# Patient Record
Sex: FEMALE | Race: BLACK OR AFRICAN AMERICAN | NOT HISPANIC OR LATINO | URBAN - METROPOLITAN AREA
[De-identification: names, ages, dates, MRNs, and addresses within clinical notes are randomized per-mention and may not be internally consistent; named-entity substitution may affect disease eponyms.]

---

## 2018-07-13 ENCOUNTER — EMERGENCY (EMERGENCY)
Facility: HOSPITAL | Age: 83
LOS: 1 days | Discharge: HOME | End: 2018-07-13
Attending: EMERGENCY MEDICINE | Admitting: EMERGENCY MEDICINE
Payer: MEDICARE

## 2018-07-13 VITALS
HEART RATE: 92 BPM | TEMPERATURE: 98 F | DIASTOLIC BLOOD PRESSURE: 96 MMHG | SYSTOLIC BLOOD PRESSURE: 209 MMHG | OXYGEN SATURATION: 97 % | RESPIRATION RATE: 16 BRPM

## 2018-07-13 VITALS
HEART RATE: 86 BPM | SYSTOLIC BLOOD PRESSURE: 185 MMHG | DIASTOLIC BLOOD PRESSURE: 78 MMHG | TEMPERATURE: 98 F | RESPIRATION RATE: 16 BRPM | OXYGEN SATURATION: 98 %

## 2018-07-13 PROBLEM — Z00.00 ENCOUNTER FOR PREVENTIVE HEALTH EXAMINATION: Status: ACTIVE | Noted: 2018-07-13

## 2018-07-13 PROCEDURE — 99284 EMERGENCY DEPT VISIT MOD MDM: CPT

## 2018-07-13 PROCEDURE — 70450 CT HEAD/BRAIN W/O DYE: CPT | Mod: 26

## 2018-07-13 PROCEDURE — 27520 TREAT KNEECAP FRACTURE: CPT

## 2018-07-13 PROCEDURE — 70450 CT HEAD/BRAIN W/O DYE: CPT

## 2018-07-13 PROCEDURE — 73562 X-RAY EXAM OF KNEE 3: CPT | Mod: 26,RT

## 2018-07-13 PROCEDURE — 99284 EMERGENCY DEPT VISIT MOD MDM: CPT | Mod: 25

## 2018-07-13 PROCEDURE — 73562 X-RAY EXAM OF KNEE 3: CPT

## 2018-07-13 RX ORDER — IBUPROFEN 200 MG
600 TABLET ORAL ONCE
Qty: 0 | Refills: 0 | Status: COMPLETED | OUTPATIENT
Start: 2018-07-13 | End: 2018-07-13

## 2018-07-13 RX ORDER — AMLODIPINE BESYLATE 2.5 MG/1
5 TABLET ORAL ONCE
Qty: 0 | Refills: 0 | Status: COMPLETED | OUTPATIENT
Start: 2018-07-13 | End: 2018-07-13

## 2018-07-13 RX ADMIN — Medication 600 MILLIGRAM(S): at 17:35

## 2018-07-13 RX ADMIN — AMLODIPINE BESYLATE 5 MILLIGRAM(S): 2.5 TABLET ORAL at 17:35

## 2018-07-13 RX ADMIN — Medication 600 MILLIGRAM(S): at 15:43

## 2018-07-13 NOTE — ED ADULT NURSE NOTE - CHIEF COMPLAINT QUOTE
pt referred her by PCP for medical evaluation s/p fall yesterday. Pt reports tripping over a toy last night and falling hitting her right knee. pt denies any use of blood thinner, Loc or head injury.  able to ambulate with a cane.

## 2018-07-13 NOTE — ED PROVIDER NOTE - SKIN, MLM
No contusion, hematoma, abrasion to the head and entire scalp is nontender.  Skin normal color for race, warm, dry and intact. No evidence of rash.
No

## 2018-07-13 NOTE — ED PROVIDER NOTE - ATTENDING CONTRIBUTION TO CARE
knee pain after trip and fall last night.  hit head but no loc or headache.  neuro exam non focal.  knee tender anteriorly.  xray done showing patellar fracture.  ct head neg.  bp elevated on arrival without symptoms of end organ damage.  suspect related to pain/ missed dose of med.  given motrin and dose of norvasc with improvement.  to f/u in clinic

## 2018-07-13 NOTE — ED ADULT NURSE NOTE - CHPI ED SYMPTOMS NEG
no loss of consciousness/no fever/no vomiting/no weakness/no tingling/no deformity/no numbness/no bleeding/no confusion/no abrasion

## 2018-07-13 NOTE — ED PROVIDER NOTE - OBJECTIVE STATEMENT
A 83 year old AA female w/ PMH of HTN presents with daughter after a fall at midnight.  The patient states that she was walking in her castro, when she tripped and fell, landing on her right knee.  Patient also reports hitting her forehead into the wall.  The patient was unsure why she fell, but states there were children's toys on the floor that she might have tripped over.  Denies any LOC.  Denies any prodromal symptoms or syncope.  The patient reports progressively worsening right knee pain with limited ROM.  Patient has been able to ambulate, but with a cane.  The patient tried taking Aleve at home w/o improvement of pain.  The patient currently denies any weakness, headache, nausea, vomiting, SOB, CP, blurring vision, diplopia, confusion, memory loss.  Of note, the patient reports she did not take her HTN medication this morning.  Patient does take ASA 81mg daily. A 83 year old AA female w/ PMH of HTN presents with daughter after a fall at midnight.  The patient states that she was walking in her castro, when she tripped and fell, landing on her right knee.  Patient also reports hitting her forehead into the wall.  The patient was unsure why she fell, but states there were children's toys on the floor that she might have tripped over.  Denies any LOC.  Denies any prodromal symptoms or syncope.  The patient reports progressively worsening right knee pain with limited ROM.  Patient has been able to ambulate, but with a cane.  The patient tried taking Aleve at home w/o improvement of pain.  The patient currently denies any weakness, headache, nausea, vomiting, SOB, CP, blurring vision, diplopia, confusion, memory loss.  Of note, the patient reports she did not take her HTN medication this morning.  Patient and daughter do not remember the name of the medication patient takes.  Patient does take ASA 81mg daily. A 83 year old AA female w/ PMH of HTN presents with daughter after a fall at midnight.  The patient states that she was walking in her castro, when she tripped and fell, landing on her right knee.  Patient also reports hitting her forehead into the wall.  The patient was unsure why she fell, but states there were children's toys on the floor that she might have tripped over.  Denies any LOC.  Denies any prodromal symptoms or syncope.  The patient reports progressively worsening right knee pain with limited ROM and rates her pain a 10/10.  Patient has been able to ambulate, but with a cane.  The patient tried taking Aleve at home w/o improvement of pain.  The patient currently denies any weakness, headache, nausea, vomiting, SOB, CP, blurring vision, diplopia, confusion, memory loss.  Of note, the patient reports she did not take her HTN medication this morning.  Patient and daughter do not remember the name of the medication patient takes.  Patient does take ASA 81mg daily.

## 2018-07-13 NOTE — ED PROVIDER NOTE - CONSTITUTIONAL, MLM
normal... Awake, alert, oriented to person, place, time/situation and in mild painful distress, ambulating with antalgic gait and with a cane.

## 2018-07-13 NOTE — ED ADULT NURSE NOTE - OBJECTIVE STATEMENT
Pt came to ED complaining of right knee pain due to mechanical trip and fall to right knee last night. Pt is able to move knee and walk with cane. Not obvious DCAPBTLS found at this time. Pt is alert and oriented x3. Denies head trauma or LOC.

## 2018-07-13 NOTE — ED ADULT TRIAGE NOTE - CHIEF COMPLAINT QUOTE
pt referred her by PCP for medical evaluation s/p fall yesterday. Pt reports tripping over a toy last night and falling hitting her right knee. pt referred her by PCP for medical evaluation s/p fall yesterday. Pt reports tripping over a toy last night and falling hitting her right knee. pt denies any use of blood thinner, Loc or head injury.  able to ambulate with a cane.

## 2018-07-13 NOTE — ED PROVIDER NOTE - MEDICAL DECISION MAKING DETAILS
PT PLACED IN KNEE IMMOBILE AND fu ORTHO ON MONDAY dc WITH FAMILY I have discussed the discharge plan with the patient. The patient agrees with the plan, as discussed.  The patient understands Emergency Department diagnosis is a preliminary diagnosis often based on limited information and that the patient must adhere to the follow-up plan as discussed.  The patient understands that if the symptoms worsen or if prescribed medications do not have the desired/planned effect that the patient may return to the Emergency Department at any time for further evaluation and treatment.

## 2018-07-13 NOTE — ED PROVIDER NOTE - MUSCULOSKELETAL, MLM
Contusion to the right knee with moderate effusion.  Tenderness anterior knee with limited ROM.  Lachman negative, Nilda's negative.

## 2018-07-16 ENCOUNTER — APPOINTMENT (OUTPATIENT)
Dept: ORTHOPEDIC SURGERY | Facility: CLINIC | Age: 83
End: 2018-07-16
Payer: MEDICARE

## 2018-07-17 ENCOUNTER — APPOINTMENT (OUTPATIENT)
Dept: ORTHOPEDIC SURGERY | Facility: CLINIC | Age: 83
End: 2018-07-17
Payer: MEDICARE

## 2018-07-17 VITALS — WEIGHT: 153 LBS | BODY MASS INDEX: 30.04 KG/M2 | HEIGHT: 60 IN | OXYGEN SATURATION: 98 %

## 2018-07-17 DIAGNOSIS — M25.561 PAIN IN RIGHT KNEE: ICD-10-CM

## 2018-07-17 DIAGNOSIS — I10 ESSENTIAL (PRIMARY) HYPERTENSION: ICD-10-CM
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
## 2018-07-17 DIAGNOSIS — S82.034A NONDISPLACED TRANSVERSE FRACTURE OF RIGHT PATELLA, INITIAL ENCOUNTER FOR CLOSED FRACTURE: ICD-10-CM

## 2018-07-17 DIAGNOSIS — S09.90XA UNSPECIFIED INJURY OF HEAD, INITIAL ENCOUNTER: ICD-10-CM
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
## 2018-07-17 PROCEDURE — 99203 OFFICE O/P NEW LOW 30 MIN: CPT

## 2018-08-27 ENCOUNTER — APPOINTMENT (OUTPATIENT)
Dept: ORTHOPEDIC SURGERY | Facility: CLINIC | Age: 83
End: 2018-08-27

## 2018-09-11 ENCOUNTER — FORM ENCOUNTER (OUTPATIENT)
Age: 83
End: 2018-09-11

## 2018-09-12 ENCOUNTER — OUTPATIENT (OUTPATIENT)
Dept: OUTPATIENT SERVICES | Facility: HOSPITAL | Age: 83
LOS: 1 days | End: 2018-09-12
Payer: MEDICARE

## 2018-09-12 ENCOUNTER — APPOINTMENT (OUTPATIENT)
Dept: ORTHOPEDIC SURGERY | Facility: CLINIC | Age: 83
End: 2018-09-12
Payer: MEDICARE

## 2018-09-12 PROCEDURE — 73562 X-RAY EXAM OF KNEE 3: CPT | Mod: 26,RT

## 2018-09-12 PROCEDURE — 99213 OFFICE O/P EST LOW 20 MIN: CPT

## 2018-09-12 PROCEDURE — 73562 X-RAY EXAM OF KNEE 3: CPT

## 2018-10-15 ENCOUNTER — FORM ENCOUNTER (OUTPATIENT)
Age: 83
End: 2018-10-15

## 2018-10-16 ENCOUNTER — OUTPATIENT (OUTPATIENT)
Dept: OUTPATIENT SERVICES | Facility: HOSPITAL | Age: 83
LOS: 1 days | End: 2018-10-16
Payer: MEDICARE

## 2018-10-16 ENCOUNTER — APPOINTMENT (OUTPATIENT)
Dept: ORTHOPEDIC SURGERY | Facility: CLINIC | Age: 83
End: 2018-10-16
Payer: MEDICARE

## 2018-10-16 VITALS — WEIGHT: 153 LBS | BODY MASS INDEX: 30.04 KG/M2 | HEIGHT: 60 IN

## 2018-10-16 DIAGNOSIS — S82.034A NONDISPLACED TRANSVERSE FRACTURE OF RIGHT PATELLA, INITIAL ENCOUNTER FOR CLOSED FRACTURE: ICD-10-CM

## 2018-10-16 PROCEDURE — 73562 X-RAY EXAM OF KNEE 3: CPT

## 2018-10-16 PROCEDURE — 99213 OFFICE O/P EST LOW 20 MIN: CPT

## 2018-10-16 PROCEDURE — 73562 X-RAY EXAM OF KNEE 3: CPT | Mod: 26,RT

## 2018-10-17 PROBLEM — S82.034A CLOSED NONDISPLACED TRANSVERSE FRACTURE OF RIGHT PATELLA, INITIAL ENCOUNTER: Status: ACTIVE | Noted: 2018-07-17

## 2022-09-12 ENCOUNTER — APPOINTMENT (OUTPATIENT)
Dept: SURGERY | Facility: CLINIC | Age: 87
End: 2022-09-12

## 2022-09-12 VITALS
SYSTOLIC BLOOD PRESSURE: 147 MMHG | HEIGHT: 60 IN | DIASTOLIC BLOOD PRESSURE: 75 MMHG | BODY MASS INDEX: 29.64 KG/M2 | TEMPERATURE: 97.3 F | HEART RATE: 99 BPM | WEIGHT: 151 LBS | OXYGEN SATURATION: 96 %

## 2022-09-12 DIAGNOSIS — R22.2 LOCALIZED SWELLING, MASS AND LUMP, TRUNK: ICD-10-CM

## 2022-09-12 DIAGNOSIS — Z87.442 PERSONAL HISTORY OF URINARY CALCULI: ICD-10-CM

## 2022-09-12 DIAGNOSIS — Z78.9 OTHER SPECIFIED HEALTH STATUS: ICD-10-CM

## 2022-09-12 DIAGNOSIS — Z82.69 FAMILY HISTORY OF OTHER DISEASES OF THE MUSCULOSKELETAL SYSTEM AND CONNECTIVE TISSUE: ICD-10-CM

## 2022-09-12 DIAGNOSIS — Z83.3 FAMILY HISTORY OF DIABETES MELLITUS: ICD-10-CM

## 2022-09-12 DIAGNOSIS — Z85.3 PERSONAL HISTORY OF MALIGNANT NEOPLASM OF BREAST: ICD-10-CM

## 2022-09-12 DIAGNOSIS — Z86.79 PERSONAL HISTORY OF OTHER DISEASES OF THE CIRCULATORY SYSTEM: ICD-10-CM

## 2022-09-12 PROCEDURE — 99203 OFFICE O/P NEW LOW 30 MIN: CPT

## 2022-09-12 NOTE — REASON FOR VISIT
[Consultation] : a consultation visit [FreeTextEntry1] : Consultation requested by: Dr. Sharon Campos

## 2022-09-12 NOTE — ASSESSMENT
[FreeTextEntry1] : Ms. Esquivel is an 87-year-old woman with an anterior chest wall mass.  Given her history of breast cancer, we will perform an ultrasound of the area prior to planning for excision of the anterior chest wall mass.

## 2022-09-12 NOTE — CONSULT LETTER
[FreeTextEntry1] : 2022\par \par \par \par Sharon Campos M.D.\par [facility]\par [address] [place] [address]\[place] [address]\par Telephone #: [phone]\par \par \par Re: Jena Esquivel\par : 1935\par \par \par Dear Dr. Campos:\par \par I had the opportunity to see Ms. Esquivel today for evaluation and management of a mass of the midline sternum.  She stated the mass has been present for approximately 1 year.  She stated approximately 2 months ago it spontaneously ruptured.  She noticed the mass herself.  She denied pain of the mass.  She stated the mass is waxing and waning in size.  It began "leaking" this week, and is draining purulent fluid.\par \par On physical examination, her height is 5 feet, her weight is 151 pounds, and BMI 29.49.  Her temperature is 97.3 °F, blood pressure is 147/75, heart rate 99, and O2 saturation is 96% on room air.  In general, she is a well-dressed, well-nourished woman who appears her stated age and is in no acute distress.  She is calm, alert and oriented x3.  HEENT exam demonstrates no scleral icterus and a normocephalic atraumatic appearance.  Her extremities are warm and dry without clubbing, cyanosis or edema.  Her anterior chest wall has an approximately 2 cm mass that is fluctuant without overlying erythema.  There is some leakage/drainage from the lateral edge.  With pressure, some greyish material was expressed.\par \par In summary, Ms. Esquivel is an 87-year-old woman with an anterior chest wall mass.  Given her history of breast cancer, we will perform an ultrasound of the area prior to planning for excision of the anterior chest wall mass.\par \par Thank you for the opportunity to care for this patient. Please do not hesitate to contact me in the event that you have any questions or concerns about the care of this patient.\par \par Sincerely,\par \par \par \par Lynda Steven M.D.

## 2022-09-12 NOTE — HISTORY OF PRESENT ILLNESS
[de-identified] : Ms. Esquivel presented today for evaluation and management of a mass of the midline sternum.  She stated the mass has been present for approximately 1 year.  She stated approximately 2 months ago it spontaneously ruptured.  She noticed the mass herself.  She denied pain of the mass.  She stated the mass is waxing and waning in size.  It began "leaking" this week, and is draining purulent fluid.

## 2022-09-12 NOTE — PHYSICAL EXAM
[Calm] : calm [de-identified] : NAD, comfortable [de-identified] : NCAT, no scleral icterus [de-identified] : No clubbing, cyanosis, or edema. [de-identified] : Warm, dry.  Anterior chest wall has an approximately 2 cm mass without overlying erythema noted.  With pressure, greyish material was expressed. [de-identified] : A&Ox3

## 2022-09-13 ENCOUNTER — APPOINTMENT (OUTPATIENT)
Dept: ULTRASOUND IMAGING | Facility: HOSPITAL | Age: 87
End: 2022-09-13

## 2022-09-13 ENCOUNTER — OUTPATIENT (OUTPATIENT)
Dept: OUTPATIENT SERVICES | Facility: HOSPITAL | Age: 87
LOS: 1 days | End: 2022-09-13
Payer: MEDICARE

## 2022-09-13 PROCEDURE — 76604 US EXAM CHEST: CPT

## 2022-09-13 PROCEDURE — 76604 US EXAM CHEST: CPT | Mod: 26

## 2022-09-15 DIAGNOSIS — Z01.818 ENCOUNTER FOR OTHER PREPROCEDURAL EXAMINATION: ICD-10-CM

## 2022-09-20 ENCOUNTER — RESULT REVIEW (OUTPATIENT)
Age: 87
End: 2022-09-20

## 2022-09-20 PROCEDURE — 88312 SPECIAL STAINS GROUP 1: CPT | Mod: 26

## 2022-09-21 ENCOUNTER — RESULT REVIEW (OUTPATIENT)
Age: 87
End: 2022-09-21

## 2022-09-21 ENCOUNTER — APPOINTMENT (OUTPATIENT)
Dept: INTERVENTIONAL RADIOLOGY/VASCULAR | Facility: HOSPITAL | Age: 87
End: 2022-09-21

## 2022-09-21 ENCOUNTER — OUTPATIENT (OUTPATIENT)
Dept: OUTPATIENT SERVICES | Facility: HOSPITAL | Age: 87
LOS: 1 days | End: 2022-09-21
Payer: MEDICARE

## 2022-09-21 PROCEDURE — 20206 BIOPSY MUSCLE PERQ NEEDLE: CPT

## 2022-09-21 PROCEDURE — 88341 IMHCHEM/IMCYTCHM EA ADD ANTB: CPT

## 2022-09-21 PROCEDURE — 88305 TISSUE EXAM BY PATHOLOGIST: CPT | Mod: 26

## 2022-09-21 PROCEDURE — 88307 TISSUE EXAM BY PATHOLOGIST: CPT

## 2022-09-21 PROCEDURE — 88341 IMHCHEM/IMCYTCHM EA ADD ANTB: CPT | Mod: 26

## 2022-09-21 PROCEDURE — 88312 SPECIAL STAINS GROUP 1: CPT

## 2022-09-21 PROCEDURE — 88342 IMHCHEM/IMCYTCHM 1ST ANTB: CPT | Mod: 26

## 2022-09-21 PROCEDURE — 88173 CYTOPATH EVAL FNA REPORT: CPT | Mod: 26

## 2022-09-21 PROCEDURE — 88173 CYTOPATH EVAL FNA REPORT: CPT

## 2022-09-21 PROCEDURE — 76942 ECHO GUIDE FOR BIOPSY: CPT

## 2022-09-21 PROCEDURE — 76942 ECHO GUIDE FOR BIOPSY: CPT | Mod: 26

## 2022-09-21 PROCEDURE — 88307 TISSUE EXAM BY PATHOLOGIST: CPT | Mod: 26

## 2022-09-21 PROCEDURE — 88305 TISSUE EXAM BY PATHOLOGIST: CPT

## 2022-09-23 LAB — NON-GYNECOLOGICAL CYTOLOGY STUDY: SIGNIFICANT CHANGE UP

## 2022-09-27 LAB — SURGICAL PATHOLOGY STUDY: SIGNIFICANT CHANGE UP

## 2022-09-28 ENCOUNTER — NON-APPOINTMENT (OUTPATIENT)
Age: 87
End: 2022-09-28

## 2023-07-07 ENCOUNTER — APPOINTMENT (OUTPATIENT)
Dept: INTERVENTIONAL RADIOLOGY/VASCULAR | Facility: HOSPITAL | Age: 88
End: 2023-07-07